# Patient Record
Sex: MALE | Race: OTHER | URBAN - METROPOLITAN AREA
[De-identification: names, ages, dates, MRNs, and addresses within clinical notes are randomized per-mention and may not be internally consistent; named-entity substitution may affect disease eponyms.]

---

## 2018-09-15 ENCOUNTER — OFFICE VISIT (OUTPATIENT)
Dept: FAMILY MEDICINE CLINIC | Age: 33
End: 2018-09-15

## 2018-09-15 VITALS
TEMPERATURE: 98.2 F | HEIGHT: 66 IN | HEART RATE: 65 BPM | BODY MASS INDEX: 31.02 KG/M2 | DIASTOLIC BLOOD PRESSURE: 75 MMHG | WEIGHT: 193 LBS | SYSTOLIC BLOOD PRESSURE: 123 MMHG

## 2018-09-15 DIAGNOSIS — H65.91 RIGHT NON-SUPPURATIVE OTITIS MEDIA: Primary | ICD-10-CM

## 2018-09-15 RX ORDER — AMOXICILLIN 500 MG/1
500 CAPSULE ORAL 3 TIMES DAILY
Qty: 30 CAP | Refills: 0 | Status: SHIPPED | OUTPATIENT
Start: 2018-09-15 | End: 2018-09-25

## 2018-09-15 RX ORDER — IBUPROFEN 800 MG/1
800 TABLET ORAL
Qty: 30 TAB | Refills: 0 | Status: SHIPPED | OUTPATIENT
Start: 2018-09-15

## 2018-09-15 NOTE — PROGRESS NOTES
Assessment/Plan:       ICD-10-CM ICD-9-CM    1. Right non-suppurative otitis media H65.91 381.4 amoxicillin (AMOXIL) 500 mg capsule      ibuprofen (MOTRIN) 800 mg tablet     Follow-up Disposition:  Return if symptoms worsen or fail to improve. Darginny Rizzo 34, [address]  [address] [facility] [place] [address]  Phone: [phone] Fax: [phone]      [address]  Subjective:     Chief Complaint   Patient presents with    Ear Pain     Ear Pain X about 4 days    Danny Beavers is a 35 y.o. OTHER male. HPI: right ear. He had a cold for 1 week. At the end of the cold he had ear pain. Not hearing well. He  has no past medical history on file. Review of Systems: Negative for: fever, chest pain, shortness of breath, leg swelling, exertional dyspnea, palpitations. Current Medications:   Current Outpatient Prescriptions on File Prior to Visit   Medication Sig    neomycin-colist-hydrocortisone-thonzonium (CORTISPORIN-TC) otic suspension Administer 2 Drops into each ear four (4) times daily. No current facility-administered medications on file prior to visit. Social History: He  reports that he has been smoking. He has never used smokeless tobacco. He reports that he drinks about 0.5 oz of alcohol per week  He reports that he does not use illicit drugs. Objective:     Vitals:    09/15/18 1443   BP: 123/75   Pulse: 65   Temp: 98.2 °F (36.8 °C)   TempSrc: Oral   Weight: 193 lb (87.5 kg)   Height: 5' 5.95\" (1.675 m)    No LMP for male patient. Wt Readings from Last 2 Encounters:   09/15/18 193 lb (87.5 kg)   06/26/14 187 lb 9.6 oz (85.1 kg)     No results found for any visits on 09/15/18. Physical Examination: No cerumen present. General appearance - well developed, no acute distress. ENT - Poor visualization of TM landmarks. + Anterior cervical lymphadenopathy.   No pain with palpation of mastoid bone. Chest - clear to auscultation. Heart - regular rate and rhythm without murmurs, rubs, or gallops. Abdomen - bowel sounds present x 4, NT, ND. Extremities - pulses intact. No peripheral edema. Assessment/Plan:   Diagnoses and all orders for this visit:    1. Right non-suppurative otitis media  -     amoxicillin (AMOXIL) 500 mg capsule; Take 1 Cap by mouth three (3) times daily for 10 days. [place] sig  -     ibuprofen (MOTRIN) 800 mg tablet; Take 1 Tab by mouth every eight (8) hours as needed for Pain. And inflammation. [place] sig      Follow-up Disposition:  Return if symptoms worsen or fail to improve. Herbie Massey, NICANOR, FNP-BC, BC-ADM  Sara Sinha expressed understanding of this plan.    Stop smoking  ?some symptoms of intermittent claudication

## 2018-09-15 NOTE — PATIENT INSTRUCTIONS
Infección del oído (otitis media): Instrucciones de cuidado - [ Ear Infection (Otitis Media): Care Instructions ]  Instrucciones de cuidado    Lizzy infección de oído podría comenzar con un resfriado y afectar el oído medio (otitis media). Puede doler mucho. La mayoría de las infecciones de oído sanan por sí solas en un par de días. A menudo, no se necesitan antibióticos. La razón es que muchas infecciones de oído están causadas por virus. Los antibióticos no funcionan contra los virus. Las dosis regulares de analgésicos (medicamentos para el dolor) son la mejor manera de reducir la fiebre y ayudarle a sentirse mejor. La atención de seguimiento es lizzy parte clave de honeycutt tratamiento y seguridad. Asegúrese de hacer y acudir a todas las citas, y llame a honeycutt médico si está teniendo problemas. También es lizzy buena idea saber los resultados de kofi exámenes y mantener lizzy lista de los medicamentos que salas. ¿Cómo puede cuidarse en el hogar? · [employer] analgésicos exactamente eliel le fueron indicados. ¨ Si el médico le recetó un analgésico, tómelo según las indicaciones. ¨ Si no está tomando un analgésico recetado, tome cesar de venta thomas, eliel acetaminofén (Tylenol), ibuprofeno (Advil, Motrin) o naproxeno (Aleve). Jasmin y siga todas las instrucciones de la [place]. ¨ No tome dos o más analgésicos al mismo tiempo a menos que el médico se lo haya indicado. Muchos analgésicos contienen acetaminofén, es decir, Tylenol. El exceso de acetaminofén (Tylenol) puede ser dañino. · Planee tomarse lizzy dosis completa de analgésico antes de acostarse. Dormir lo suficiente le ayudará a sentirse mejor. · Pruebe con lizzy toallita tibia húmeda en el oído. Sharif vez le ayude a aliviar el dolor. · Si honeycutt médico le recetó antibióticos, tómelos según las indicaciones. No deje de tomarlos por el hecho de sentirse mejor. Debe mariza todos los antibióticos hasta terminarlos. ¿Cuándo debe pedir ayuda?   Llame a honeycutt médico ahora mismo o busque atención médica inmediata si:    · Tiene dolor de oído nuevo o que [place].     · Honeycutt oído presenta secreción de pus o juan carlos nueva o que está aumentando.     · Tiene fiebre junto con rigidez en el bebo o un dolor de desmond intenso.    Preste especial atención a los cambios en honeycutt rosa y asegúrese de comunicarse con honeycutt médico si:    · Tiene síntomas nuevos o que empeoran.     · No mejora después de chanell tomado un antibiótico charity 2 días. ¿Dónde puede encontrar más información en inglés? Letty Castellanos a http://bunny-flo.info/. Twila Spittle en la búsqueda para aprender más acerca de \"Infección del oído (otitis media): Instrucciones de cuidado - [ Ear Infection (Otitis Media): Care Instructions ]. \"  Revisado: 12 doe, 2017  Versión del contenido: 11.7  © 8991-7311 [employer]. Las instrucciones de cuidado fueron adaptadas bajo licencia por [employer] (which disclaims liability or warranty for this information). Si usted tiene [place] afección médica o sobre estas instrucciones, siempre pregunte a honeycutt profesional de rosa. [employer] Inc[employer] niega toda garantía o responsabilidad por honeycutt uso de esta información.

## 2018-09-15 NOTE — PROGRESS NOTES
Coordination of Care  1. Have you been to the ER, urgent care clinic since your last visit? Hospitalized since your last visit? No    2. Have you seen or consulted any other health care providers outside of the [address] [place] [address] since your last visit? Include any pap smears or colon screening. No    Does the patient need refills? N/A    Learning Assessment Complete?  yes

## 2018-09-20 ENCOUNTER — LAB ONLY (OUTPATIENT)
Dept: FAMILY MEDICINE CLINIC | Age: 33
End: 2018-09-20

## 2018-09-20 ENCOUNTER — HOSPITAL ENCOUNTER (OUTPATIENT)
Dept: LAB | Age: 33
Discharge: HOME | End: 2018-09-20

## 2018-09-20 DIAGNOSIS — Z13.9 ENCOUNTER FOR SCREENING: Primary | ICD-10-CM

## 2018-09-20 LAB
CHOLEST SERPL-MCNC: 162 MG/DL
HDLC SERPL-MCNC: 32 MG/DL
HDLC SERPL: 5.1 {RATIO} (ref 0–5)
LDLC SERPL CALC-MCNC: 99.2 MG/DL (ref 0–100)
LIPID PROFILE,FLP: ABNORMAL
TRIGL SERPL-MCNC: 154 MG/DL (ref ?–150)
VLDLC SERPL CALC-MCNC: 30.8 MG/DL

## 2018-09-20 PROCEDURE — 80061 LIPID PANEL: CPT | Performed by: NURSE PRACTITIONER

## 2018-09-20 NOTE — PROGRESS NOTES
Patient walked in to the clinic this am, patient was seen previously on the clinic on 9/15/18 no lab orders in place for patient. Spoke with Jamal Duenas N.P. Provider who saw patient on 09/15/18, per Aminah mccloud to place order for Lipid panel only. Order was placed in [address] [place] [address].

## 2018-09-23 NOTE — PROGRESS NOTES
HDL or \"good\" cholesterol is low - can be increased with increasing physical activity. Otherwise cholesterol OK.